# Patient Record
Sex: FEMALE | Race: BLACK OR AFRICAN AMERICAN | Employment: UNEMPLOYED | URBAN - METROPOLITAN AREA
[De-identification: names, ages, dates, MRNs, and addresses within clinical notes are randomized per-mention and may not be internally consistent; named-entity substitution may affect disease eponyms.]

---
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
## 2022-03-19 PROBLEM — E88.819 INSULIN RESISTANCE: Status: ACTIVE | Noted: 2018-02-12

## 2022-03-19 PROBLEM — N92.1 METRORRHAGIA: Status: ACTIVE | Noted: 2018-09-07

## 2023-10-27 ENCOUNTER — HOSPITAL ENCOUNTER (EMERGENCY)
Facility: HOSPITAL | Age: 23
Discharge: HOME | End: 2023-10-27
Payer: MEDICAID

## 2023-10-27 VITALS
WEIGHT: 245 LBS | RESPIRATION RATE: 18 BRPM | BODY MASS INDEX: 49.39 KG/M2 | DIASTOLIC BLOOD PRESSURE: 73 MMHG | OXYGEN SATURATION: 95 % | HEART RATE: 83 BPM | TEMPERATURE: 97.9 F | HEIGHT: 59 IN | SYSTOLIC BLOOD PRESSURE: 132 MMHG

## 2023-10-27 DIAGNOSIS — N93.8 DUB (DYSFUNCTIONAL UTERINE BLEEDING): Primary | ICD-10-CM

## 2023-10-27 DIAGNOSIS — N30.01 ACUTE CYSTITIS WITH HEMATURIA: ICD-10-CM

## 2023-10-27 DIAGNOSIS — A59.9 TRICHOMONIASIS: ICD-10-CM

## 2023-10-27 LAB
APPEARANCE UR: ABNORMAL
BACTERIA URNS QL MICRO: NEGATIVE /HPF
BILIRUB UR QL: NEGATIVE
CLUE CELLS VAG QL WET PREP: NORMAL
COLOR UR: ABNORMAL
EPITH CASTS URNS QL MICRO: ABNORMAL /LPF
GLUCOSE UR STRIP.AUTO-MCNC: NEGATIVE MG/DL
HCG UR QL: NEGATIVE
HGB UR QL STRIP: ABNORMAL
KETONES UR QL STRIP.AUTO: ABNORMAL MG/DL
KOH PREP SPEC: NORMAL
LEUKOCYTE ESTERASE UR QL STRIP.AUTO: ABNORMAL
NITRITE UR QL STRIP.AUTO: NEGATIVE
PH UR STRIP: 5.5 (ref 5–8)
PROT UR STRIP-MCNC: 30 MG/DL
RBC #/AREA URNS HPF: ABNORMAL /HPF (ref 0–5)
SERVICE CMNT-IMP: NORMAL
SP GR UR REFRACTOMETRY: 1.02
T VAGINALIS VAG QL WET PREP: NORMAL
TRICHOMONAS UR QL MICRO: PRESENT
URINE CULTURE IF INDICATED: ABNORMAL
UROBILINOGEN UR QL STRIP.AUTO: 1 EU/DL (ref 0.2–1)
WBC URNS QL MICRO: ABNORMAL /HPF (ref 0–4)

## 2023-10-27 PROCEDURE — 81025 URINE PREGNANCY TEST: CPT

## 2023-10-27 PROCEDURE — 87210 SMEAR WET MOUNT SALINE/INK: CPT

## 2023-10-27 PROCEDURE — 87591 N.GONORRHOEAE DNA AMP PROB: CPT

## 2023-10-27 PROCEDURE — 87491 CHLMYD TRACH DNA AMP PROBE: CPT

## 2023-10-27 PROCEDURE — 81001 URINALYSIS AUTO W/SCOPE: CPT

## 2023-10-27 PROCEDURE — 99283 EMERGENCY DEPT VISIT LOW MDM: CPT

## 2023-10-27 PROCEDURE — 87086 URINE CULTURE/COLONY COUNT: CPT

## 2023-10-27 RX ORDER — METRONIDAZOLE 500 MG/1
500 TABLET ORAL 2 TIMES DAILY
Qty: 14 TABLET | Refills: 0 | Status: SHIPPED | OUTPATIENT
Start: 2023-10-27 | End: 2023-11-03

## 2023-10-27 RX ORDER — CEPHALEXIN 500 MG/1
500 CAPSULE ORAL 3 TIMES DAILY
Qty: 21 CAPSULE | Refills: 0 | Status: SHIPPED | OUTPATIENT
Start: 2023-10-27

## 2023-10-27 ASSESSMENT — PAIN DESCRIPTION - DESCRIPTORS: DESCRIPTORS: CRAMPING

## 2023-10-27 ASSESSMENT — PAIN SCALES - GENERAL: PAINLEVEL_OUTOF10: 10

## 2023-10-27 ASSESSMENT — PAIN DESCRIPTION - LOCATION: LOCATION: ABDOMEN

## 2023-10-27 ASSESSMENT — PAIN - FUNCTIONAL ASSESSMENT: PAIN_FUNCTIONAL_ASSESSMENT: 0-10

## 2023-10-27 NOTE — ED NOTES
Patient (s)  given copy of dc instructions and 2 script(s). Patient (s)  verbalized understanding of instructions and script (s). Patient given a current medication reconciliation form and verbalized understanding of their medications. Patient (s) verbalized understanding of the importance of discussing medications with his or her physician or clinic they will be following up with. Patient alert and oriented and in no acute distress. Patient discharged home ambulatory with self.       Beto Sotelo RN  10/27/23 [phone]

## 2023-10-27 NOTE — ED NOTES
Pt refused to get lab work stating things was taking too long and she had to  her child from the bus. Pt was upset because of some test results she received and was not receptive to anything upon getting the results.      Geneva Viramontes RN  10/27/23 [phone]

## 2023-10-28 LAB
BACTERIA SPEC CULT: NORMAL
C TRACH DNA SPEC QL NAA+PROBE: NEGATIVE
CC UR VC: NORMAL
N GONORRHOEA DNA SPEC QL NAA+PROBE: NEGATIVE
SAMPLE TYPE: NORMAL
SERVICE CMNT-IMP: NORMAL
SERVICE CMNT-IMP: NORMAL
SPECIMEN SOURCE: NORMAL

## 2023-10-28 ASSESSMENT — ENCOUNTER SYMPTOMS
ABDOMINAL PAIN: 1
BACK PAIN: 0
SHORTNESS OF BREATH: 0

## 2024-01-15 ENCOUNTER — HOSPITAL ENCOUNTER (EMERGENCY)
Facility: HOSPITAL | Age: 24
Discharge: HOME | End: 2024-01-15
Payer: MEDICAID

## 2024-01-15 VITALS
HEART RATE: 84 BPM | SYSTOLIC BLOOD PRESSURE: 123 MMHG | DIASTOLIC BLOOD PRESSURE: 79 MMHG | OXYGEN SATURATION: 99 % | RESPIRATION RATE: 19 BRPM | BODY MASS INDEX: 50.4 KG/M2 | HEIGHT: 59 IN | TEMPERATURE: 98.2 F | WEIGHT: 250 LBS

## 2024-01-15 DIAGNOSIS — N30.00 ACUTE CYSTITIS WITHOUT HEMATURIA: ICD-10-CM

## 2024-01-15 DIAGNOSIS — B96.89 BACTERIAL VAGINOSIS: ICD-10-CM

## 2024-01-15 DIAGNOSIS — N76.0 BACTERIAL VAGINOSIS: ICD-10-CM

## 2024-01-15 DIAGNOSIS — R11.2 NAUSEA AND VOMITING, UNSPECIFIED VOMITING TYPE: ICD-10-CM

## 2024-01-15 DIAGNOSIS — R10.2 SUPRAPUBIC PAIN: Primary | ICD-10-CM

## 2024-01-15 LAB
APPEARANCE UR: CLEAR
BACTERIA URNS QL MICRO: ABNORMAL /HPF
BILIRUB UR QL: NEGATIVE
CLUE CELLS VAG QL WET PREP: NORMAL
COLOR UR: ABNORMAL
EPITH CASTS URNS QL MICRO: ABNORMAL /LPF
GLUCOSE UR STRIP.AUTO-MCNC: NEGATIVE MG/DL
HCG SERPL-ACNC: <1 MIU/ML (ref 0–6)
HCG UR QL: NEGATIVE
HGB UR QL STRIP: ABNORMAL
KETONES UR QL STRIP.AUTO: NEGATIVE MG/DL
KOH PREP SPEC: NORMAL
LEUKOCYTE ESTERASE UR QL STRIP.AUTO: ABNORMAL
NITRITE UR QL STRIP.AUTO: NEGATIVE
PH UR STRIP: 7.5 (ref 5–8)
PROT UR STRIP-MCNC: ABNORMAL MG/DL
RBC #/AREA URNS HPF: ABNORMAL /HPF (ref 0–5)
SERVICE CMNT-IMP: NORMAL
SP GR UR REFRACTOMETRY: 1.01
T VAGINALIS VAG QL WET PREP: NORMAL
URINE CULTURE IF INDICATED: ABNORMAL
UROBILINOGEN UR QL STRIP.AUTO: 0.2 EU/DL (ref 0.2–1)
WBC URNS QL MICRO: ABNORMAL /HPF (ref 0–4)

## 2024-01-15 PROCEDURE — 87591 N.GONORRHOEAE DNA AMP PROB: CPT

## 2024-01-15 PROCEDURE — 81025 URINE PREGNANCY TEST: CPT

## 2024-01-15 PROCEDURE — 87491 CHLMYD TRACH DNA AMP PROBE: CPT

## 2024-01-15 PROCEDURE — 87086 URINE CULTURE/COLONY COUNT: CPT

## 2024-01-15 PROCEDURE — 84702 CHORIONIC GONADOTROPIN TEST: CPT

## 2024-01-15 PROCEDURE — 87186 SC STD MICRODIL/AGAR DIL: CPT

## 2024-01-15 PROCEDURE — 87077 CULTURE AEROBIC IDENTIFY: CPT

## 2024-01-15 PROCEDURE — 87210 SMEAR WET MOUNT SALINE/INK: CPT

## 2024-01-15 PROCEDURE — 6370000000 HC RX 637 (ALT 250 FOR IP): Performed by: PHYSICIAN ASSISTANT

## 2024-01-15 PROCEDURE — 99283 EMERGENCY DEPT VISIT LOW MDM: CPT

## 2024-01-15 PROCEDURE — 81001 URINALYSIS AUTO W/SCOPE: CPT

## 2024-01-15 RX ORDER — METRONIDAZOLE 500 MG/1
500 TABLET ORAL 2 TIMES DAILY
Qty: 14 TABLET | Refills: 0 | Status: SHIPPED | OUTPATIENT
Start: 2024-01-15 | End: 2024-01-22

## 2024-01-15 RX ORDER — ONDANSETRON 4 MG/1
4 TABLET, ORALLY DISINTEGRATING ORAL EVERY 8 HOURS PRN
Qty: 12 TABLET | Refills: 0 | Status: SHIPPED | OUTPATIENT
Start: 2024-01-15

## 2024-01-15 RX ORDER — DICYCLOMINE HCL 20 MG
20 TABLET ORAL EVERY 6 HOURS
Qty: 20 TABLET | Refills: 0 | Status: SHIPPED | OUTPATIENT
Start: 2024-01-15 | End: 2024-01-15

## 2024-01-15 RX ORDER — ONDANSETRON 4 MG/1
4 TABLET, ORALLY DISINTEGRATING ORAL
Status: DISCONTINUED | OUTPATIENT
Start: 2024-01-15 | End: 2024-01-16 | Stop reason: HOSPADM

## 2024-01-15 RX ORDER — ONDANSETRON 4 MG/1
4 TABLET, ORALLY DISINTEGRATING ORAL EVERY 8 HOURS PRN
Status: DISCONTINUED | OUTPATIENT
Start: 2024-01-15 | End: 2024-01-15

## 2024-01-15 RX ORDER — NITROFURANTOIN 25; 75 MG/1; MG/1
100 CAPSULE ORAL 2 TIMES DAILY
Qty: 10 CAPSULE | Refills: 0 | Status: SHIPPED | OUTPATIENT
Start: 2024-01-15 | End: 2024-01-18

## 2024-01-15 RX ADMIN — ONDANSETRON 4 MG: 4 TABLET, ORALLY DISINTEGRATING ORAL at 21:33

## 2024-01-15 ASSESSMENT — ENCOUNTER SYMPTOMS
BACK PAIN: 0
NAUSEA: 1
SORE THROAT: 0
COLOR CHANGE: 0
TROUBLE SWALLOWING: 0
SHORTNESS OF BREATH: 0
VOMITING: 1
ABDOMINAL PAIN: 1
COUGH: 0
DIARRHEA: 0
CONSTIPATION: 1

## 2024-01-15 ASSESSMENT — PAIN DESCRIPTION - FREQUENCY: FREQUENCY: CONTINUOUS

## 2024-01-15 ASSESSMENT — PAIN SCALES - GENERAL: PAINLEVEL_OUTOF10: 10

## 2024-01-15 ASSESSMENT — PAIN DESCRIPTION - DESCRIPTORS: DESCRIPTORS: ACHING

## 2024-01-15 ASSESSMENT — PAIN DESCRIPTION - LOCATION: LOCATION: ABDOMEN

## 2024-01-15 ASSESSMENT — PAIN DESCRIPTION - PAIN TYPE: TYPE: ACUTE PAIN

## 2024-01-15 ASSESSMENT — PAIN - FUNCTIONAL ASSESSMENT: PAIN_FUNCTIONAL_ASSESSMENT: 0-10

## 2024-01-15 ASSESSMENT — PAIN DESCRIPTION - ORIENTATION: ORIENTATION: LOWER

## 2024-01-16 LAB
C TRACH DNA SPEC QL NAA+PROBE: NEGATIVE
N GONORRHOEA DNA SPEC QL NAA+PROBE: NEGATIVE
SAMPLE TYPE: NORMAL
SERVICE CMNT-IMP: NORMAL
SPECIMEN SOURCE: NORMAL

## 2024-01-16 NOTE — ED TRIAGE NOTES
Pt reports suprapubic for a couple weeks. Pt denies dysuria, frequency, urgency or hesitancy. Pt reports vomiting started today. Pt reports bad heartburn. No OTC meds PTA for heartburn.  Pt believes pregnancy is possible. No home test

## 2024-01-16 NOTE — DISCHARGE INSTRUCTIONS
Follow up with your Ob/Gyn for recheck. Return to the ER for any continued/worsening pelvic pain.

## 2024-01-16 NOTE — ED NOTES
Pt presents ambulatory to ER CC of abd pain for approx. 8 weeks. Pt states last menstrual cycle was November 2023 for a duration of 7 days. In December 24th and 25th pt did notice some bleeding for two days but since then has not had menstrual cycle. Pt has some lower abd pain, 10/10. Pt stats she has also had some vomiting that started suddenly around 1400 and vomited once more approx. 30 minutes ago. Pt alert and awake. NAD. Airway patent. Moves all four extremities.

## 2024-01-16 NOTE — ED PROVIDER NOTES
abdominal issues, GERD. No recent illness. No dysuria, hematuria, rash/lesion.  Pt is o/w healthy without fever, chills, cough, congestion, ST, shortness of breath, chest pain.           Disposition Considerations (Tests not done, Shared Decision Making, Pt Expectation of Test or Tx.): reviewed results with patient. She requested add'l testing for pregnancy as the last time she had a neg urine pregnancy test, she was found to be pregnant on US at her following Ob/GYN appt. Will add quant serum pregnancy.      FINAL IMPRESSION     1. Suprapubic pain    2. Bacterial vaginosis    3. Nausea and vomiting, unspecified vomiting type    4. Acute cystitis without hematuria          DISPOSITION/PLAN   DISPOSITION      Discharge Note: The patient is stable for discharge home. The signs, symptoms, diagnosis, and discharge instructions have been discussed, understanding conveyed, and agreed upon. The patient is to follow up as recommended or return to ER should their symptoms worsen.      PATIENT REFERRED TO:  No follow-up provider specified.     DISCHARGE MEDICATIONS:     Medication List        ASK your doctor about these medications      cephALEXin 500 MG capsule  Commonly known as: Keflex  Take 1 capsule by mouth 3 times daily                DISCONTINUED MEDICATIONS:  Discharge Medication List as of 1/15/2024 11:36 PM        I have discussed the history and physical, results, MDM, and treatment plan with my supervising physician, No att. providers found, who agrees with my plan.     I am the Primary Clinician of Record.   JORGE Marcus (electronically signed)    (Please note that parts of this dictation were completed with voice recognition software. Quite often unanticipated grammatical, syntax, homophones, and other interpretive errors are inadvertently transcribed by the computer software. Please disregards these errors. Please excuse any errors that have escaped final proofreading.)        Tessa Denis PA  01/16/24

## 2024-01-16 NOTE — ED NOTES
Patient (s)  given copy of dc instructions and 3 script(s).  Patient (s)  verbalized understanding of instructions and script (s).  Patient given a current medication reconciliation form and verbalized understanding of their medications.   Patient (s) verbalized understanding of the importance of discussing medications with  his or her physician or clinic they will be following up with.  Patient alert and oriented and in no acute distress.  Patient discharged home ambulatory with self.

## 2024-01-18 LAB
BACTERIA SPEC CULT: ABNORMAL
CC UR VC: ABNORMAL
SERVICE CMNT-IMP: ABNORMAL

## 2024-01-18 RX ORDER — CEPHALEXIN 500 MG/1
500 CAPSULE ORAL 4 TIMES DAILY
Qty: 28 CAPSULE | Refills: 0 | Status: SHIPPED | OUTPATIENT
Start: 2024-01-18 | End: 2024-01-25

## 2025-01-22 ENCOUNTER — APPOINTMENT (OUTPATIENT)
Facility: HOSPITAL | Age: 25
End: 2025-01-22
Payer: MEDICAID

## 2025-01-22 ENCOUNTER — HOSPITAL ENCOUNTER (EMERGENCY)
Facility: HOSPITAL | Age: 25
Discharge: HOME | End: 2025-01-22
Payer: MEDICAID

## 2025-01-22 VITALS
RESPIRATION RATE: 15 BRPM | OXYGEN SATURATION: 99 % | DIASTOLIC BLOOD PRESSURE: 88 MMHG | TEMPERATURE: 98.7 F | WEIGHT: 266.1 LBS | HEIGHT: 59 IN | BODY MASS INDEX: 53.64 KG/M2 | SYSTOLIC BLOOD PRESSURE: 169 MMHG | HEART RATE: 95 BPM

## 2025-01-22 DIAGNOSIS — Z34.90 INTRAUTERINE PREGNANCY: ICD-10-CM

## 2025-01-22 DIAGNOSIS — R10.30 LOWER ABDOMINAL PAIN: Primary | ICD-10-CM

## 2025-01-22 LAB
ABO + RH BLD: NORMAL
ANION GAP SERPL CALC-SCNC: 9 MMOL/L (ref 2–12)
APPEARANCE UR: CLEAR
BACTERIA URNS QL MICRO: NEGATIVE /HPF
BASOPHILS # BLD: 0.03 K/UL (ref 0–0.1)
BASOPHILS NFR BLD: 0.3 % (ref 0–1)
BILIRUB UR QL: NEGATIVE
BLOOD BANK CMNT PATIENT-IMP: NORMAL
BUN SERPL-MCNC: 4 MG/DL (ref 6–20)
BUN/CREAT SERPL: 5 (ref 12–20)
C TRACH DNA SPEC QL NAA+PROBE: NEGATIVE
CALCIUM SERPL-MCNC: 8.6 MG/DL (ref 8.5–10.1)
CHLORIDE SERPL-SCNC: 102 MMOL/L (ref 97–108)
CLUE CELLS VAG QL WET PREP: NORMAL
CO2 SERPL-SCNC: 24 MMOL/L (ref 21–32)
COLOR UR: ABNORMAL
CREAT SERPL-MCNC: 0.86 MG/DL (ref 0.55–1.02)
DIFFERENTIAL METHOD BLD: ABNORMAL
EOSINOPHIL # BLD: 0.06 K/UL (ref 0–0.4)
EOSINOPHIL NFR BLD: 0.7 % (ref 0–7)
EPITH CASTS URNS QL MICRO: ABNORMAL /LPF
ERYTHROCYTE [DISTWIDTH] IN BLOOD BY AUTOMATED COUNT: 14.8 % (ref 11.5–14.5)
GLUCOSE SERPL-MCNC: 78 MG/DL (ref 65–100)
GLUCOSE UR STRIP.AUTO-MCNC: NEGATIVE MG/DL
HCG SERPL-ACNC: ABNORMAL MIU/ML (ref 0–6)
HCG UR QL: POSITIVE
HCT VFR BLD AUTO: 36.5 % (ref 35–47)
HGB BLD-MCNC: 12.3 G/DL (ref 11.5–16)
HGB UR QL STRIP: NEGATIVE
IMM GRANULOCYTES # BLD AUTO: 0.02 K/UL (ref 0–0.04)
IMM GRANULOCYTES NFR BLD AUTO: 0.2 % (ref 0–0.5)
KETONES UR QL STRIP.AUTO: NEGATIVE MG/DL
LEUKOCYTE ESTERASE UR QL STRIP.AUTO: ABNORMAL
LYMPHOCYTES # BLD: 2.54 K/UL (ref 0.8–3.5)
LYMPHOCYTES NFR BLD: 29.6 % (ref 12–49)
MCH RBC QN AUTO: 26.1 PG (ref 26–34)
MCHC RBC AUTO-ENTMCNC: 33.7 G/DL (ref 30–36.5)
MCV RBC AUTO: 77.5 FL (ref 80–99)
MONOCYTES # BLD: 0.61 K/UL (ref 0–1)
MONOCYTES NFR BLD: 7.1 % (ref 5–13)
N GONORRHOEA DNA SPEC QL NAA+PROBE: NEGATIVE
NEUTS SEG # BLD: 5.33 K/UL (ref 1.8–8)
NEUTS SEG NFR BLD: 62.1 % (ref 32–75)
NITRITE UR QL STRIP.AUTO: NEGATIVE
NRBC # BLD: 0 K/UL (ref 0–0.01)
NRBC BLD-RTO: 0 PER 100 WBC
PH UR STRIP: 5.5 (ref 5–8)
PLATELET # BLD AUTO: 397 K/UL (ref 150–400)
PMV BLD AUTO: 9.8 FL (ref 8.9–12.9)
POTASSIUM SERPL-SCNC: 3.7 MMOL/L (ref 3.5–5.1)
PROT UR STRIP-MCNC: NEGATIVE MG/DL
RBC # BLD AUTO: 4.71 M/UL (ref 3.8–5.2)
RBC #/AREA URNS HPF: ABNORMAL /HPF (ref 0–5)
SAMPLE TYPE: NORMAL
SERVICE CMNT-IMP: NORMAL
SODIUM SERPL-SCNC: 135 MMOL/L (ref 136–145)
SP GR UR REFRACTOMETRY: 1.02
SPECIMEN SOURCE: NORMAL
T VAGINALIS VAG QL WET PREP: NORMAL
URINE CULTURE IF INDICATED: ABNORMAL
UROBILINOGEN UR QL STRIP.AUTO: 0.2 EU/DL (ref 0.2–1)
WBC # BLD AUTO: 8.6 K/UL (ref 3.6–11)
WBC URNS QL MICRO: ABNORMAL /HPF (ref 0–4)
YEAST: NORMAL

## 2025-01-22 PROCEDURE — 87210 SMEAR WET MOUNT SALINE/INK: CPT

## 2025-01-22 PROCEDURE — 76817 TRANSVAGINAL US OBSTETRIC: CPT

## 2025-01-22 PROCEDURE — 76801 OB US < 14 WKS SINGLE FETUS: CPT

## 2025-01-22 PROCEDURE — 84702 CHORIONIC GONADOTROPIN TEST: CPT

## 2025-01-22 PROCEDURE — 99284 EMERGENCY DEPT VISIT MOD MDM: CPT

## 2025-01-22 PROCEDURE — 85025 COMPLETE CBC W/AUTO DIFF WBC: CPT

## 2025-01-22 PROCEDURE — 86900 BLOOD TYPING SEROLOGIC ABO: CPT

## 2025-01-22 PROCEDURE — 36415 COLL VENOUS BLD VENIPUNCTURE: CPT

## 2025-01-22 PROCEDURE — 81001 URINALYSIS AUTO W/SCOPE: CPT

## 2025-01-22 PROCEDURE — 80048 BASIC METABOLIC PNL TOTAL CA: CPT

## 2025-01-22 PROCEDURE — 86901 BLOOD TYPING SEROLOGIC RH(D): CPT

## 2025-01-22 PROCEDURE — 87491 CHLMYD TRACH DNA AMP PROBE: CPT

## 2025-01-22 PROCEDURE — 87591 N.GONORRHOEAE DNA AMP PROB: CPT

## 2025-01-22 PROCEDURE — 6370000000 HC RX 637 (ALT 250 FOR IP)

## 2025-01-22 PROCEDURE — 81025 URINE PREGNANCY TEST: CPT

## 2025-01-22 RX ORDER — ACETAMINOPHEN 500 MG
1000 TABLET ORAL EVERY 6 HOURS PRN
Qty: 30 TABLET | Refills: 0 | Status: SHIPPED | OUTPATIENT
Start: 2025-01-22

## 2025-01-22 RX ORDER — ACETAMINOPHEN 500 MG
1000 TABLET ORAL
Status: COMPLETED | OUTPATIENT
Start: 2025-01-22 | End: 2025-01-22

## 2025-01-22 RX ORDER — PRENATAL VIT/IRON FUM/FOLIC AC 27MG-0.8MG
1 TABLET ORAL DAILY
Qty: 30 TABLET | Refills: 0 | Status: SHIPPED | OUTPATIENT
Start: 2025-01-22

## 2025-01-22 RX ADMIN — ACETAMINOPHEN 1000 MG: 500 TABLET ORAL at 16:38

## 2025-01-22 ASSESSMENT — PAIN SCALES - GENERAL
PAINLEVEL_OUTOF10: 7
PAINLEVEL_OUTOF10: 10

## 2025-01-22 ASSESSMENT — PAIN DESCRIPTION - LOCATION: LOCATION: ABDOMEN;BACK

## 2025-01-22 ASSESSMENT — PAIN - FUNCTIONAL ASSESSMENT: PAIN_FUNCTIONAL_ASSESSMENT: 0-10

## 2025-01-22 NOTE — ED NOTES
Discharge instructions were given to the patient by RUDY Phoenix.     The patient left the Emergency Department alert and oriented and in no acute distress with 2 prescriptions. The patient was encouraged to call or return to the ED for worsening issues or problems and was encouraged to schedule a follow up appointment for continuing care.     Ambulation assessment completed before discharge.  Pt left Emergency Department ambulating at baseline with no ortho devices  Ortho device education: none    The patient verbalized understanding of discharge instructions and prescriptions, all questions were answered. The patient has no further concerns at this time.

## 2025-01-22 NOTE — DISCHARGE INSTRUCTIONS
Electronically signed by LENCHO DURAN          The exam and treatment you received in the Emergency Department were for an urgent problem and are not intended as complete care. It is important that you follow up with a doctor, nurse practitioner, or physician assistant for ongoing care. If your symptoms become worse or you do not improve as expected and you are unable to reach your usual health care provider, you should return to the Emergency Department. We are available 24 hours a day.    Please take your discharge instructions with you when you go to your follow-up appointment.     If a prescription has been provided, please have it filled as soon as possible to prevent a delay in treatment. Read the entire medication instruction sheet provided to you by the pharmacy. If you have any questions or reservations about taking the medication due to side effects or interactions with other medications, please call your primary care physician or contact the ER to speak with the charge nurse.     Please make an appointment with your family doctor or the physician you were referred to for follow-up of this visit as instructed on your discharge paperwork. Return to the ER if you are unable to be seen or if you are unable to be seen in a timely manner.    Should you experience abdominal pain lasting greater than 6 hours, chest pain, difficulty breathing, fever/chills, numbness/tingling, skin changes or other symptoms that concern you, return to the ED sooner. If you feel worse over the next 24 hours, please return to the ED. We are available 24 hours a day. Thank you for trusting us with your care!

## 2025-01-22 NOTE — ED TRIAGE NOTES
Pt presents ambulatory to ED complaining of lower abdominal pain, pelvic pain, and lower back pain. Pt denies vaginal discharge, vaginal bleeding, dysuria, nausea, vomiting, or constipation.

## 2025-01-22 NOTE — ED NOTES
Pt presents to ED complaining of lower abdominal pain, pelvic pain, and lower back pain. Pt denies any vaginal discharge/bleeding, dysuria, N/V, or constipation. Pt is alert and oriented x 4, RR even and unlabored, skin is warm and dry. Pt appears in NAD at this time. Assessment completed and pt updated on plan of care.  Call bell in reach.    Emergency Department Nursing Plan of Care  The Nursing Plan of Care is developed from the Nursing assessment and Emergency Department Attending provider initial evaluation.  The plan of care may be reviewed in the “ED Provider note”.      The Plan of Care was developed with the following considerations:  Patient / Family readiness to learn indicated by:Refer to Medical chart in [facility]  Persons(s) to be included in education: Refer to Medical chart in [facility]  Barriers to Learning/Limitations:Normal     Signed    Lizett Zaidi RN  1/22/2025 2:03 PM

## 2025-01-22 NOTE — ED PROVIDER NOTES
[facility] EMERGENCY DEPARTMENT  EMERGENCY DEPARTMENT ENCOUNTER       Pt Name: Irvin Gooden  MRN: [id number]  Birthdate 2000  Date of evaluation: 2025  Provider: Elysia Flores PA-C   PCP: Kelli Cortez MD  Note Started: 2:53 PM EST 25     CHIEF COMPLAINT       Chief Complaint   Patient presents with    Abdominal Pain        HISTORY OF PRESENT ILLNESS: 1 or more elements      History From: Patient  HPI Limitations: None     Irvin Gooden is a 24 y.o. female who presents complaining of lower abdominal pain x 1 day.  Patient reports that her last menstrual cycle was .  She states she does not always have regular cycles.  She reports 1 previous pregnancy 4 years ago with a .  No other pregnancies, miscarriages, abortions.  No previous pregnancy complications.  She denies any associated symptoms.  She denies any vaginal bleeding, vaginal discharge, vaginal odor.  She denies any fever, chills, nausea, vomiting, dysuria, hematuria, urinary frequency, urine urgency, urinary hesitancy.  She denies any chest pain or shortness of breath.  Denies any lightheadedness, dizziness, syncope.     Nursing Notes were all reviewed and agreed with or any disagreements were addressed in the HPI.     REVIEW OF SYSTEMS      Review of Systems     Positives and Pertinent negatives as per HPI.    PAST HISTORY     Past Medical History:  No past medical history on file.    Past Surgical History:  No past surgical history on file.    Family History:  No family history on file.    Social History:  Social History     Tobacco Use    Smoking status: Never     Passive exposure: Never    Smokeless tobacco: Never   Vaping Use    Vaping status: Never Used   Substance Use Topics    Alcohol use: Not Currently    Drug use: Never       Allergies:  Allergies   Allergen Reactions    Latex Swelling       CURRENT MEDICATIONS      Discharge Medication List as of 2025  5:01 PM        CONTINUE these